# Patient Record
Sex: FEMALE | Race: WHITE | NOT HISPANIC OR LATINO | Employment: FULL TIME
[De-identification: names, ages, dates, MRNs, and addresses within clinical notes are randomized per-mention and may not be internally consistent; named-entity substitution may affect disease eponyms.]

---

## 2021-04-27 ENCOUNTER — TRANSCRIBE ORDERS (OUTPATIENT)
Dept: OTHER | Age: 61
End: 2021-04-27

## 2021-04-27 DIAGNOSIS — M79.3 LIPODERMATOSCLEROSIS: Primary | ICD-10-CM

## 2021-06-06 ENCOUNTER — HEALTH MAINTENANCE LETTER (OUTPATIENT)
Age: 61
End: 2021-06-06

## 2021-09-26 ENCOUNTER — HEALTH MAINTENANCE LETTER (OUTPATIENT)
Age: 61
End: 2021-09-26

## 2021-11-04 ENCOUNTER — TELEPHONE (OUTPATIENT)
Dept: DERMATOLOGY | Facility: CLINIC | Age: 61
End: 2021-11-04

## 2021-11-04 NOTE — TELEPHONE ENCOUNTER
Called patient and notified of appointment . Scheduled    Vikki TELLEZRN BSN  [facility]  [phone]

## 2022-03-03 ENCOUNTER — OFFICE VISIT (OUTPATIENT)
Dept: DERMATOLOGY | Facility: CLINIC | Age: 62
End: 2022-03-03
Attending: SPECIALIST
Payer: COMMERCIAL

## 2022-03-03 DIAGNOSIS — M79.3 LIPODERMATOSCLEROSIS OF LEFT LOWER EXTREMITY: Primary | ICD-10-CM

## 2022-03-03 PROCEDURE — 99204 OFFICE O/P NEW MOD 45 MIN: CPT | Performed by: DERMATOLOGY

## 2022-03-03 RX ORDER — TRAMADOL HYDROCHLORIDE 50 MG/1
TABLET ORAL
COMMUNITY
Start: 2010-01-01

## 2022-03-03 RX ORDER — MELOXICAM 15 MG/1
15 TABLET ORAL DAILY
COMMUNITY
Start: 2021-12-14

## 2022-03-03 RX ORDER — LOSARTAN POTASSIUM AND HYDROCHLOROTHIAZIDE 12.5; 5 MG/1; MG/1
1 TABLET ORAL DAILY
COMMUNITY
Start: 2021-01-01

## 2022-03-03 RX ORDER — TRIAMCINOLONE ACETONIDE 1 MG/G
CREAM TOPICAL
Qty: 30 G | Refills: 11 | Status: SHIPPED | OUTPATIENT
Start: 2022-03-03

## 2022-03-03 RX ORDER — LORAZEPAM 0.5 MG/1
0.5 TABLET ORAL
COMMUNITY
Start: 2022-03-01

## 2022-03-03 RX ORDER — FLUTICASONE PROPIONATE 50 MCG
1 SPRAY, SUSPENSION (ML) NASAL
COMMUNITY
Start: 2022-02-24

## 2022-03-03 NOTE — PROGRESS NOTES
[facility] Dermatology Note  Encounter Date: Mar 3, 2022  Office Visit     Dermatology Problem List:  1. Lipodermatosclerosis  - triamcinolone 0.1% cream, daily compression stockings  ____________________________________________    Assessment & Plan:    # Pink indurated papules, left lower leg. Chronic problem, flared. Intermittent ulcerations that heal quickly. Differential includes lipodermatosclerosis vs less likely necrobiosis lipoidica. Patient also has spider veins in the lower legs, which makes me believe lipodermatosclerosis is the most likely diagnosis. She has also had many vein procedures. No history of diabetes. Discussed the greatest concern is ulceration, as it is likely to take very long to heal.  - Recommended diligent daily compression stockings. I will write a prescription for these today. Reviewed this may not be covered by insurance. Discussed patient should start with 15-20 then increasing to 20-30 for long-term use. Wear during the day, take off during the nights.  - Start triamcinolone 0.1% cream once daily x 3 months.  - Photo taken today.  - Future considerations: trentol    Procedures Performed:   None.    Follow-up: 3 months to recheck the left lower leg, sooner for concerns.    Staff and Scribe:     Scribe Disclosure:   I, Nat Colunga, am serving as a scribe to document services personally performed by this physician, Dr. Miriam Vail, based on data collection and the provider's statements to me.     Provider Disclosure:   The documentation recorded by the scribe accurately reflects the services I personally performed and the decisions made by me.    Miriam Vail MD    Department of Dermatology  [facility]: Phone: [phone], Fax:[phone]  [facility]: Phone: [phone], Fax:  [phone]    ____________________________________________    CC: Derm Problem (Discuss lipodermatosclerosis-left ankle. Wearing compression stocking. Wondering if related to Reynauds)    HPI:  Ms. Flor Giraldo is a(n) 61 year old female who presents today as a new patient for spot check.    She is new to our department. Previously seen at [facility]. She has no history of skin cancer, atypical moles, or precancerous lesions to her knowledge.    Referred for lipodermatosclerosis by [facility]. No records were available at the time of the visit.    Today, Flor would like to discuss the left ankle. Previously seen Dr. Lam at [facility] and was told to wear compression stockings. She does have a number of compression stockings, wears this only on the left ankle. There is a history of sprains to the left leg. She is wondering if this is related to Reynauds. Has been clinically diagnosed with lipodermatosclerosis. Has not had a biopsy done in the past.    Patient is otherwise feeling well, without additional skin concerns.    Labs Reviewed:  N/A    Physical Exam:  Vitals: There were no vitals taken for this visit.  SKIN: Focused examination of the lower legs was performed.  - Pink indurated papules on the left lower leg.  - No other lesions of concern on areas examined.     Medications:  Current Outpatient Medications   Medication     cholecalciferol 25 MCG (1000 UT) TABS     fluticasone (FLONASE) 50 MCG/ACT nasal spray     LORazepam (ATIVAN) 0.5 MG tablet     losartan-hydrochlorothiazide (HYZAAR) 50-12.5 MG tablet     meloxicam (MOBIC) 15 MG tablet     traMADol (ULTRAM) 50 MG tablet     No current facility-administered medications for this visit.      Past Medical History:   There is no problem list on file for this patient.    No past medical history on file.     CC Tiffany Serra MD  [facility]  [address] [place] [address] on close of  this encounter.

## 2022-03-03 NOTE — PATIENT INSTRUCTIONS
Compression stockings: start with 10-20mmHg then work up to using 20-30mmHg  Topical: Apply the triamcinolone 0.1% cream once daily to the area until your follow up in 3 months.

## 2022-03-03 NOTE — NURSING NOTE
Flor Giraldo's goals for this visit include:   Chief Complaint   Patient presents with     Derm Problem     Discuss lipodermatosclerosis-left ankle. Wearing compression stocking. Wondering if related to Ata       She requests these members of her care team be copied on today's visit information:     PCP: No Ref-Primary, Physician    Referring Provider:  Tiffany Serra MD  [facility]  [address] [place] [address]    There were no vitals taken for this visit.    Do you need any medication refills at today's visit? Rosalia Klein on 3/3/2022 at 3:15 PM

## 2022-04-04 ENCOUNTER — TELEPHONE (OUTPATIENT)
Dept: DERMATOLOGY | Facility: CLINIC | Age: 62
End: 2022-04-04
Payer: COMMERCIAL

## 2022-04-04 NOTE — TELEPHONE ENCOUNTER
4/4 2nd attempt. Provided phone number [phone] to schedule follow up  in about 3 months (around 6/3/2022) for in person.    Fatou jang Procedure   Orthopedics, Podiatry, Sports Medicine, ENT/Eye Specialties  [facility]   [phone]

## 2022-07-02 ENCOUNTER — HEALTH MAINTENANCE LETTER (OUTPATIENT)
Age: 62
End: 2022-07-02

## 2023-04-23 ENCOUNTER — HEALTH MAINTENANCE LETTER (OUTPATIENT)
Age: 63
End: 2023-04-23

## 2023-07-15 ENCOUNTER — HEALTH MAINTENANCE LETTER (OUTPATIENT)
Age: 63
End: 2023-07-15

## 2024-06-29 ENCOUNTER — HEALTH MAINTENANCE LETTER (OUTPATIENT)
Age: 64
End: 2024-06-29

## 2025-02-14 ENCOUNTER — TRANSFERRED RECORDS (OUTPATIENT)
Dept: HEALTH INFORMATION MANAGEMENT | Facility: CLINIC | Age: 65
End: 2025-02-14
Payer: COMMERCIAL

## 2025-03-27 PROCEDURE — 87070 CULTURE OTHR SPECIMN AEROBIC: CPT | Mod: ORL | Performed by: DERMATOLOGY

## 2025-03-28 ENCOUNTER — LAB REQUISITION (OUTPATIENT)
Dept: LAB | Facility: CLINIC | Age: 65
End: 2025-03-28
Payer: COMMERCIAL

## 2025-03-28 DIAGNOSIS — L30.8 OTHER SPECIFIED DERMATITIS: ICD-10-CM

## 2025-03-30 LAB — BACTERIA SKIN AEROBE CULT: NORMAL
